# Patient Record
Sex: FEMALE
[De-identification: names, ages, dates, MRNs, and addresses within clinical notes are randomized per-mention and may not be internally consistent; named-entity substitution may affect disease eponyms.]

---

## 2024-07-31 DIAGNOSIS — Z00.00 ENCOUNTER FOR GENERAL ADULT MEDICAL EXAMINATION W/OUT ABNORMAL FINDINGS: ICD-10-CM

## 2024-08-12 ENCOUNTER — RESULT REVIEW (OUTPATIENT)
Age: 29
End: 2024-08-12

## 2024-08-12 ENCOUNTER — APPOINTMENT (OUTPATIENT)
Dept: HEMATOLOGY ONCOLOGY | Facility: CLINIC | Age: 29
End: 2024-08-12
Payer: COMMERCIAL

## 2024-08-12 VITALS
OXYGEN SATURATION: 100 % | RESPIRATION RATE: 17 BRPM | DIASTOLIC BLOOD PRESSURE: 70 MMHG | TEMPERATURE: 97 F | WEIGHT: 170 LBS | HEIGHT: 62 IN | BODY MASS INDEX: 31.28 KG/M2 | SYSTOLIC BLOOD PRESSURE: 116 MMHG | HEART RATE: 70 BPM

## 2024-08-12 DIAGNOSIS — D50.9 ANEMIA COMPLICATING PREGNANCY, UNSPECIFIED TRIMESTER: ICD-10-CM

## 2024-08-12 DIAGNOSIS — Z78.9 OTHER SPECIFIED HEALTH STATUS: ICD-10-CM

## 2024-08-12 DIAGNOSIS — Z86.2 PERSONAL HISTORY OF DISEASES OF THE BLOOD AND BLOOD-FORMING ORGANS AND CERTAIN DISORDERS INVOLVING THE IMMUNE MECHANISM: ICD-10-CM

## 2024-08-12 DIAGNOSIS — E53.8 DEFICIENCY OF OTHER SPECIFIED B GROUP VITAMINS: ICD-10-CM

## 2024-08-12 DIAGNOSIS — O99.019 ANEMIA COMPLICATING PREGNANCY, UNSPECIFIED TRIMESTER: ICD-10-CM

## 2024-08-12 PROCEDURE — 99204 OFFICE O/P NEW MOD 45 MIN: CPT

## 2024-08-12 RX ORDER — ASCORBIC ACID 500 MG
TABLET ORAL
Refills: 0 | Status: ACTIVE | COMMUNITY

## 2024-08-12 RX ORDER — IRON/IRON ASP GLY/FA/MV-MIN 38 125-25-1MG
TABLET ORAL
Refills: 0 | Status: ACTIVE | COMMUNITY

## 2024-08-14 PROBLEM — E53.8 VITAMIN B12 DEFICIENCY: Status: ACTIVE | Noted: 2024-08-14

## 2024-08-14 PROBLEM — O99.019 IRON DEFICIENCY ANEMIA OF PREGNANCY: Status: ACTIVE | Noted: 2024-08-14

## 2024-08-14 NOTE — ASSESSMENT
[FreeTextEntry1] :  #Iron deficiency anemia of pregnancy Discussed physiology of iron and erythropoiesis. Discussed iron tablet vs liquid vs intravenous 32weeks pregnant Patient started oral iron compliant 4 months Vegat[employer]  Ferritin <30 B12 is borderline, will send b12 to pharmacy Will do IV iron with venofer x 5 doses    Labs next appointment: CBC, iron, B12, ferritin, retic

## 2024-08-14 NOTE — HISTORY OF PRESENT ILLNESS
[de-identified] : 29 year F  here with anemia.   Denies anemia prior to pregnancy.   Told she was anemic 4 months ago.   Has been compliant with oral iron supplements.   Denies history of thyroid or celiac disease. Denies heavy menses Denies EGD or colonoscopy   Denies fatigue/lightheadedness, dizziness.   Patient hasn't taken iron supplements or IV iron in the past.   patient here for consultation for anemia, referred by [facility].  riley#[id number]. Due date September 27. 3rd pregnancy. no prior anemia in pregnancies. no prior anemia before pregnancy. no recent endoscopy or colonoscopy. no bleeding. 8/12 hgb 10.4 7/8 ferritin 13

## 2024-08-14 NOTE — HISTORY OF PRESENT ILLNESS
[de-identified] : 29 year F  here with anemia.   Denies anemia prior to pregnancy.   Told she was anemic 4 months ago.   Has been compliant with oral iron supplements.   Denies history of thyroid or celiac disease. Denies heavy menses Denies EGD or colonoscopy   Denies fatigue/lightheadedness, dizziness.   Patient hasn't taken iron supplements or IV iron in the past.   patient here for consultation for anemia, referred by [facility].  riley#[id number]. Due date September 27. 3rd pregnancy. no prior anemia in pregnancies. no prior anemia before pregnancy. no recent endoscopy or colonoscopy. no bleeding. 8/12 hgb 10.4 7/8 ferritin 13

## 2024-08-14 NOTE — HISTORY OF PRESENT ILLNESS
[de-identified] : 29 year F  here with anemia.   Denies anemia prior to pregnancy.   Told she was anemic 4 months ago.   Has been compliant with oral iron supplements.   Denies history of thyroid or celiac disease. Denies heavy menses Denies EGD or colonoscopy   Denies fatigue/lightheadedness, dizziness.   Patient hasn't taken iron supplements or IV iron in the past.   patient here for consultation for anemia, referred by [facility].  [email or web address]#[id number]. Due date September 27. 3rd pregnancy. no prior anemia in pregnancies. no prior anemia before pregnancy. no recent endoscopy or colonoscopy. no bleeding. 8/12 hgb 10.4 7/8 ferritin 13

## 2024-08-14 NOTE — RESULTS/DATA
[FreeTextEntry1] : April 2024 ferritin 35 Hgb 12.6  June 2024 Ferritin 12 Hgb 11.7  July 2024 Ferritin 13  August 2024  Hgb 10.4

## 2024-09-21 ENCOUNTER — TRANSCRIPTION ENCOUNTER (OUTPATIENT)
Age: 29
End: 2024-09-21